# Patient Record
Sex: FEMALE
[De-identification: names, ages, dates, MRNs, and addresses within clinical notes are randomized per-mention and may not be internally consistent; named-entity substitution may affect disease eponyms.]

---

## 2020-12-29 ENCOUNTER — NURSE TRIAGE (OUTPATIENT)
Dept: OTHER | Facility: CLINIC | Age: 20
End: 2020-12-29

## 2020-12-30 NOTE — TELEPHONE ENCOUNTER
Pt called with c/o being positive with COVID  With mild symptoms. Pt states she now having nasal burning but denies runny nose or congestion or loss of smell or taste. Pt denies chest pain or SOB. Per disposition pt can treat symptoms at home and pt instructed call PCP with further questions regarding symptoms. See assessment below. Caller provided care advice and instructed to call back with worsening symptoms or if becomes acutely ill to go to nearest ED or [facility]    Attention provider: Your patient utilized nurse triage services offered by employer, payer or community. This encounter includes an overview of the reason for call, assessment and recommended disposition. Please do not respond through this encounter as the response is not directed to a shared pool. Reason for Disposition   Cold with no complications    Answer Assessment - Initial Assessment Questions  1. ONSET: \"When did the nasal discharge start? \"      Pt denies    2. AMOUNT: \"How much discharge is there? \"      Pt denies    3. COUGH: \"Do you have a cough? \" If yes, ask: \"Describe the color of your sputum\" (clear, white, yellow, green)      Pt does not have current cough    4. RESPIRATORY DISTRESS: \"Describe your breathing. \"      Pt states normal    5. FEVER: \"Do you have a fever? \" If so, ask: \"What is your temperature, how was it measured, and when did it start? \"      Pt denies    6. SEVERITY: \"Overall, how bad are you feeling right now? \" (e.g., doesn't interfere with normal activities, staying home from school/work, staying in bed)      Pt denies interfere with normal activity    7. OTHER SYMPTOMS: \"Do you have any other symptoms? \" (e.g., sore throat, earache, wheezing, vomiting)     Worst complaint is nasal burning    8. PREGNANCY: \"Is there any chance you are pregnant? \" \"When was your last menstrual period? \"     Pt denies, pt has irregular periods and is unsure    Protocols used: COMMON COLD-ADULT-AH